# Patient Record
Sex: MALE | Race: WHITE | Employment: FULL TIME | URBAN - METROPOLITAN AREA
[De-identification: names, ages, dates, MRNs, and addresses within clinical notes are randomized per-mention and may not be internally consistent; named-entity substitution may affect disease eponyms.]

---

## 2017-08-08 ENCOUNTER — HOSPITAL ENCOUNTER (EMERGENCY)
Age: 72
Discharge: HOME | End: 2017-08-08
Attending: EMERGENCY MEDICINE
Payer: OTHER GOVERNMENT

## 2017-08-08 ENCOUNTER — APPOINTMENT (OUTPATIENT)
Dept: GENERAL RADIOLOGY | Age: 72
End: 2017-08-08
Attending: PHYSICIAN ASSISTANT
Payer: OTHER GOVERNMENT

## 2017-08-08 VITALS
HEIGHT: 66 IN | BODY MASS INDEX: 31.66 KG/M2 | TEMPERATURE: 98.6 F | RESPIRATION RATE: 16 BRPM | DIASTOLIC BLOOD PRESSURE: 69 MMHG | SYSTOLIC BLOOD PRESSURE: 125 MMHG | WEIGHT: 197 LBS | OXYGEN SATURATION: 89 % | HEART RATE: 71 BPM
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
## 2017-08-08 DIAGNOSIS — S70.01XA CONTUSION OF RIGHT HIP, INITIAL ENCOUNTER: ICD-10-CM

## 2017-08-08 DIAGNOSIS — R55 VASOVAGAL NEAR SYNCOPE: ICD-10-CM

## 2017-08-08 LAB
ANION GAP BLD CALC-SCNC: 16 MMOL/L (ref 10–20)
BUN BLD-MCNC: 17 MG/DL (ref 7–18)
CA-I BLD-MCNC: 1.16 MMOL/L (ref 1.12–1.32)
CHLORIDE BLD-SCNC: 101 MMOL/L (ref 100–108)
CO2 BLD-SCNC: 28 MMOL/L (ref 19–24)
CREAT UR-MCNC: 0.8 MG/DL (ref 0.6–1.3)
GLUCOSE BLD STRIP.AUTO-MCNC: 90 MG/DL (ref 74–106)
HCT VFR BLD CALC: 49 % (ref 36–49)
HGB BLD-MCNC: 16.7 G/DL (ref 12–16)
POTASSIUM BLD-SCNC: 4.4 MMOL/L (ref 3.5–5.5)
SODIUM BLD-SCNC: 140 MMOL/L (ref 136–145)
TROPONIN I BLD-MCNC: <0.04 NG/ML (ref 0–0.08)

## 2017-08-08 PROCEDURE — 99285 EMERGENCY DEPT VISIT HI MDM: CPT

## 2017-08-08 PROCEDURE — 74011250636 HC RX REV CODE- 250/636: Performed by: PHYSICIAN ASSISTANT

## 2017-08-08 PROCEDURE — 77010033678 HC OXYGEN DAILY

## 2017-08-08 PROCEDURE — 80047 BASIC METABLC PNL IONIZED CA: CPT

## 2017-08-08 PROCEDURE — 84484 ASSAY OF TROPONIN QUANT: CPT

## 2017-08-08 PROCEDURE — 74011250637 HC RX REV CODE- 250/637: Performed by: PHYSICIAN ASSISTANT

## 2017-08-08 PROCEDURE — 93005 ELECTROCARDIOGRAM TRACING: CPT

## 2017-08-08 PROCEDURE — 73502 X-RAY EXAM HIP UNI 2-3 VIEWS: CPT

## 2017-08-08 PROCEDURE — 96360 HYDRATION IV INFUSION INIT: CPT

## 2017-08-08 RX ORDER — TRAMADOL HYDROCHLORIDE 50 MG/1
50 TABLET ORAL
Qty: 12 TAB | Refills: 0 | Status: SHIPPED | OUTPATIENT
Start: 2017-08-08

## 2017-08-08 RX ORDER — TRAMADOL HYDROCHLORIDE 50 MG/1
50 TABLET ORAL
Status: COMPLETED | OUTPATIENT
Start: 2017-08-08 | End: 2017-08-08

## 2017-08-08 RX ORDER — ACETAMINOPHEN 325 MG/1
650 TABLET ORAL
Status: DISCONTINUED | OUTPATIENT
Start: 2017-08-08 | End: 2017-08-08

## 2017-08-08 RX ADMIN — TRAMADOL HYDROCHLORIDE 50 MG: 50 TABLET, FILM COATED ORAL at 08:44

## 2017-08-08 RX ADMIN — SODIUM CHLORIDE 1000 ML: 900 INJECTION, SOLUTION INTRAVENOUS at 11:29

## 2017-08-08 NOTE — DISCHARGE INSTRUCTIONS
Vasovagal Syncope: Care Instructions  Your Care Instructions    Vasovagal syncope (say \"gnn-xty-VLTBrittany AHN-kuh-pee\") is sudden dizziness or fainting that can be set off by things such as pain, stress, fear, or trauma. You may sweat or feel lightheaded, sick to your stomach, or tingly. The problem causes the heart rate to slow and the blood vessels to widen, or dilate, for a short time. When this happens, blood pools in the lower body, and less blood goes to the brain. You can usually get relief by lying down with your legs raised (elevated). This helps more blood to flow to your brain and may help relieve symptoms like feeling dizzy. Some doctors may recommend a technique that involves tensing your fists and arms. This type of fainting is often easy to predict. For example, it happens to some people when they see blood or have to get a shot. They may feel symptoms before they faint. An episode of vasovagal syncope usually responds well to self-care. Other treatment often isn't needed. But if the fainting keeps happening, your doctor may suggest further treatments. Follow-up care is a key part of your treatment and safety. Be sure to make and go to all appointments, and call your doctor if you are having problems. It's also a good idea to know your test results and keep a list of the medicines you take. How can you care for yourself at home? · Drink plenty of fluids to prevent dehydration. If you have kidney, heart, or liver disease and have to limit fluids, talk with your doctor before you increase your fluid intake. · Try to avoid things that you think may set off vasovagal syncope. · Talk to your doctor about any medicines you take. Some medicines may increase the chance of this condition occurring. · If you feel symptoms, lie down with your legs raised. Talk to your doctor about what to do if your symptoms come back. When should you call for help?   Call 911 anytime you think you may need emergency care. For example, call if:  · You have symptoms of a heart problem. These may include:  ¨ Chest pain or pressure. ¨ Severe trouble breathing. ¨ A fast or irregular heartbeat. Watch closely for changes in your health, and be sure to contact your doctor if:  · You have more episodes of fainting at home. · You do not get better as expected. Where can you learn more? Go to http://flaca-erma.info/. Enter L754 in the search box to learn more about \"Vasovagal Syncope: Care Instructions. \"  Current as of: March 20, 2017  Content Version: 11.3  © 0647-2400 [employer]. Care instructions adapted under license by [employer] (which disclaims liability or warranty for this information). If you have questions about a medical condition or this instruction, always ask your healthcare professional. [address] any warranty or liability for your use of this information. Preventing Falls: Care Instructions  Your Care Instructions  Getting around your home safely can be a challenge if you have injuries or health problems that make it easy for you to fall. Loose rugs and furniture in walkways are among the dangers for many older people who have problems walking or who have poor eyesight. People who have conditions such as arthritis, osteoporosis, or dementia also have to be careful not to fall. You can make your home safer with a few simple measures. Follow-up care is a key part of your treatment and safety. Be sure to make and go to all appointments, and call your doctor if you are having problems. It's also a good idea to know your test results and keep a list of the medicines you take. How can you care for yourself at home? Taking care of yourself  · You may get dizzy if you do not drink enough water. To prevent dehydration, drink plenty of fluids, enough so that your urine is light yellow or clear like water.  Choose water and other caffeine-free clear liquids. If you have kidney, heart, or liver disease and have to limit fluids, talk with your doctor before you increase the amount of fluids you drink. · Exercise regularly to improve your strength, muscle tone, and balance. Walk if you can. Swimming may be a good choice if you cannot walk easily. · Have your vision and hearing checked each year or any time you notice a change. If you have trouble seeing and hearing, you might not be able to avoid objects and could lose your balance. · Know the side effects of the medicines you take. Ask your doctor or pharmacist whether the medicines you take can affect your balance. Sleeping pills or sedatives can affect your balance. · Limit the amount of alcohol you drink. Alcohol can impair your balance and other senses. · Ask your doctor whether calluses or corns on your feet need to be removed. If you wear loose-fitting shoes because of calluses or corns, you can lose your balance and fall. · Talk to your doctor if you have numbness in your feet. Preventing falls at home  · Remove raised doorway thresholds, throw rugs, and clutter. Repair loose carpet or raised areas in the floor. · Move furniture and electrical cords to keep them out of walking paths. · Use nonskid floor wax, and wipe up spills right away, especially on ceramic tile floors. · If you use a walker or cane, put rubber tips on it. If you use crutches, clean the bottoms of them regularly with an abrasive pad, such as steel wool. · Keep your house well lit, especially [place], and outside walkways. Use night-lights in areas such as hallways and bathrooms. Add extra light switches or use remote switches (such as switches that go on or off when you clap your hands) to make it easier to turn lights on if you have to get up during the night. · Install sturdy handrails on stairways.   · Move items in your cabinets so that the things you use a lot are on the lower shelves (about waist level). · Keep a cordless phone and a flashlight with new batteries by your bed. If possible, put a phone in each of the main rooms of your house, or carry a cell phone in case you fall and cannot reach a phone. Or, you can wear a device around your neck or wrist. You push a button that sends a signal for help. · Wear low-heeled shoes that fit well and give your feet good support. Use footwear with nonskid soles. Check the heels and soles of your shoes for wear. Repair or replace worn heels or soles. · Do not wear socks without shoes on wood floors. · Walk on the grass when the sidewalks are slippery. If you live in an area that gets snow and ice in the winter, sprinkle salt on slippery steps and sidewalks. Preventing falls in the bath  · Install grab bars and nonskid mats inside and outside your shower or tub and near the toilet and sinks. · Use shower chairs and bath benches. · Use a hand-held shower head that will allow you to sit while showering. · Get into a tub or shower by putting the weaker leg in first. Get out of a tub or shower with your strong side first.  · Repair loose toilet seats and consider installing a raised toilet seat to make getting on and off the toilet easier. · Keep your bathroom door unlocked while you are in the shower. Where can you learn more? Go to http://flaca-erma.info/. Enter [id number] [id number] in the search box to learn more about \"Preventing Falls: Care Instructions. \"  Current as of: August 4, 2016  Content Version: 11.3  © 1129-8461 [employer]. Care instructions adapted under license by [employer] (which disclaims liability or warranty for this information). If you have questions about a medical condition or this instruction, always ask your healthcare professional. [address] any warranty or liability for your use of this information.

## 2017-08-08 NOTE — ED TRIAGE NOTES
EMS states, \" Patient fell at work. Patient uses crutches it was raining outside. Injured R. Hip and previous hip replacement. \" Pain 10/10.

## 2017-08-08 NOTE — ED NOTES
Patient allergic to Morphine. Difficultly with medication to control pain. Spoke with pharmacy and PA.

## 2017-08-08 NOTE — ED PROVIDER NOTES
HPI Comments: Patient is a 75 y/o male who presents to the ER c/o right hip pain. Patient states he had just gotten out of his truck at work, and was walking towards the building. He normally uses crutches when he ambulates, and states his \"leg was lazy\". Patient suffered a mechanical fall. He denied hitting his head, or having any LOC from the fall. He reports right hip pain that radiates into his groin. Patient reports hx of BL hip replacement surgeries that happened in the 80's. Patient states he was assisted to his feet, and tried to ambulate however the pain in his right hip was too much. No other symptoms or complaints. Patient is a 74 y.o. male presenting with fall and hip pain. The history is provided by the patient. Fall   Pertinent negatives include no fever, no abdominal pain, no nausea, no vomiting and no headaches. Hip Injury           Past Medical History:   Diagnosis Date    Arthritis     rhumatoid arthritis    Asthma     Chronic obstructive pulmonary disease ([facility] [address]     Ill-defined condition     cholestrol        Past Surgical History:   Procedure Laterality Date    HX ORTHOPAEDIC      L. and R. hip replacement         No family history on file. Social History     Social History    Marital status:      Spouse name: N/A    Number of children: N/A    Years of education: N/A     Occupational History    Not on file. Social History Main Topics    Smoking status: Not on file    Smokeless tobacco: Not on file    Alcohol use Not on file    Drug use: Not on file    Sexual activity: Not on file     Other Topics Concern    Not on file     Social History Narrative    No narrative on file         ALLERGIES: Flagyl [metronidazole]; Morphine; and Erythromycin base    Review of Systems   Constitutional: Negative for chills, fatigue and fever. HENT: Negative. Negative for sore throat. Eyes: Negative. Respiratory: Negative for cough and shortness of breath. Cardiovascular: Negative for chest pain and palpitations. Gastrointestinal: Negative for abdominal pain, nausea and vomiting. Genitourinary: Negative for dysuria. Musculoskeletal: Positive for arthralgias. R hip pain   Skin: Negative. Neurological: Negative for dizziness, weakness, light-headedness and headaches. Psychiatric/Behavioral: Negative. All other systems reviewed and are negative. Vitals:    08/08/17 1117 08/08/17 1126 08/08/17 1130 08/08/17 1200   BP:  103/74 (!) 130/110 126/81   Pulse:       Resp:       Temp:       SpO2: 91% 92% 92% 95%   Weight:       Height:                Physical Exam   Constitutional: He is oriented to person, place, and time. He appears well-developed and well-nourished. No distress. HENT:   Head: Normocephalic and atraumatic. Mouth/Throat: Oropharynx is clear and moist.   Eyes: Conjunctivae are normal. No scleral icterus. Neck: Neck supple. No JVD present. No tracheal deviation present. Cardiovascular: Normal rate, regular rhythm and normal heart sounds. Pulmonary/Chest: Effort normal and breath sounds normal. No respiratory distress. He has no wheezes. Abdominal: Soft. There is no tenderness. Musculoskeletal:        Right hip: He exhibits decreased range of motion, tenderness and bony tenderness. DROM due to pain in right hip; normal distal pulses BL. TTP right lateral hip with deep palpation; reports pain radiating to right groin; no obvious crepitus/contusion on exam   Neurological: He is alert and oriented to person, place, and time. He has normal strength. Gait normal. GCS eye subscore is 4. GCS verbal subscore is 5. GCS motor subscore is 6. Skin: Skin is warm and dry. He is not diaphoretic. Psychiatric: He has a normal mood and affect. Nursing note and vitals reviewed.        MDM  Number of Diagnoses or Management Options  Contusion of right hip, initial encounter:   Fall, initial encounter:   Vasovagal near syncope: Diagnosis management comments: 9:13 AM  75 y/o male c/o right hip pain s/p mechanical fall this morning. No LOC. Previous hip replacement in the past.  Tried to ambulate but reports increased pain. Will plan on xray, pain meds and will reeval.  Cassy Elmore PA-C    11:28 AM  xrays negative for acute fracture. Discussed results with pt. Pt able to ambulate with his crutches, however states he was pushing it to get better fast.  Per RN at pts side, he became diaphoretic and pale. pts noted to have drop in blood pressure. States he is feeling better now, however will plan on ekg, labs and reeval.  Cassy Elmore PA-C    12:04 PM  EKG NSR with RBBB rate 62; normal per pt. Pt states he feels much better with no complaints at this time. Cassy Elmore PA-C    12:54 PM  Labs reviewed and unremarkable. Discussed with pt. Will plan on finishing fluid bolus. Discussed follow up with ortho and PCP. All questions answered and patient in agreement with plan of care. Will plan for discharge. Cassy Elmore PA-C      Clinical Impression:  Fall, right hip pain, elevated blood pressure reading    One or more blood pressure readings were noted elevated during the Pt's presentation in the emergency department this date. This abnormal reading has been cited in the Pt's diagnosis, and they have been encouraged to follow up with their primary care physician, or referred to a consultant for further evaluation and treatment.           Amount and/or Complexity of Data Reviewed  Clinical lab tests: ordered and reviewed  Tests in the radiology section of CPT®: ordered and reviewed      ED Course       Procedures           Vitals:  Patient Vitals for the past 12 hrs:   Temp Pulse Resp BP SpO2   08/08/17 1200 - - - 126/81 95 %   08/08/17 1130 - - - (!) 130/110 92 %   08/08/17 1126 - - - 103/74 92 %   08/08/17 1117 - - - - 91 %   08/08/17 1114 - - - (!) 89/65 -   08/08/17 1030 - 67 15 127/88 91 %   08/08/17 0830 - 65 19 (!) 126/96 93 %   08/08/17 0807 - - - - 92 %   08/08/17 0800 - 66 17 141/88 94 %   08/08/17 0756 98.2 °F (36.8 °C) 62 18 136/82 92 %   08/08/17 0751 - 63 18 136/82 (!) 89 %         Medications ordered:   Medications   sodium chloride 0.9 % bolus infusion 1,000 mL (not administered)   traMADol (ULTRAM) tablet 50 mg (50 mg Oral Given 8/8/17 0844)         Lab findings:  Recent Results (from the past 12 hour(s))   EKG, 12 LEAD, INITIAL    Collection Time: 08/08/17 11:55 AM   Result Value Ref Range    Ventricular Rate 62 BPM    Atrial Rate 62 BPM    P-R Interval 182 ms    QRS Duration 156 ms    Q-T Interval 440 ms    QTC Calculation (Bezet) 446 ms    Calculated P Axis 27 degrees    Calculated R Axis -96 degrees    Calculated T Axis 34 degrees    Diagnosis       Normal sinus rhythm  Right bundle branch block  Abnormal ECG  No previous ECGs available         EKG interpretation by ED Physician:  NSR rate 62 RBBB; no acute ST/T wave abnormalities    X-Ray, CT or other radiology findings or impressions:  XR HIP RT W OR WO PELV 2-3 VWS   Final Result          Progress notes, Consult notes or additional Procedure notes:       Reevaluation of patient:   Pt feeling better after fluid bolus/rest    Disposition:  Diagnosis:   1. Fall, initial encounter    2. Contusion of right hip, initial encounter    3.  Vasovagal near syncope        Disposition: Discharged    Follow-up Information     Follow up With Details Comments Contact formerly [facility] EMERGENCY DEPT  If symptoms worsen [address] [place] [address]    Titus Pimentel MD Schedule an appointment as soon as possible for a visit in 1 week  [address]      [address] [facility] Call today follow up with ortho for right hip pain/fall [address] [place] [phone]           Patient's Medications   Start Taking    TRAMADOL (ULTRAM) 50 MG TABLET Take 1 Tab by mouth every eight (8) hours as needed for Pain. Max Daily Amount: 150 mg.    Continue Taking    No medications on file   These Medications have changed    No medications on file   Stop Taking    No medications on file

## 2017-08-09 LAB
ATRIAL RATE: 62 BPM
CALCULATED P AXIS, ECG09: 27 DEGREES
CALCULATED R AXIS, ECG10: -96 DEGREES
CALCULATED T AXIS, ECG11: 34 DEGREES
DIAGNOSIS, 93000: NORMAL
P-R INTERVAL, ECG05: 182 MS
Q-T INTERVAL, ECG07: 440 MS
QRS DURATION, ECG06: 156 MS
QTC CALCULATION (BEZET), ECG08: 446 MS
VENTRICULAR RATE, ECG03: 62 BPM